# Patient Record
Sex: FEMALE | Race: WHITE
[De-identification: names, ages, dates, MRNs, and addresses within clinical notes are randomized per-mention and may not be internally consistent; named-entity substitution may affect disease eponyms.]

---

## 2019-04-12 ENCOUNTER — HOSPITAL ENCOUNTER (EMERGENCY)
Dept: HOSPITAL 80 - FED | Age: 19
Discharge: HOME | End: 2019-04-12
Payer: COMMERCIAL

## 2019-04-12 VITALS — SYSTOLIC BLOOD PRESSURE: 114 MMHG | DIASTOLIC BLOOD PRESSURE: 79 MMHG

## 2019-04-12 DIAGNOSIS — R11.0: ICD-10-CM

## 2019-04-12 DIAGNOSIS — E86.9: ICD-10-CM

## 2019-04-12 DIAGNOSIS — N93.9: ICD-10-CM

## 2019-04-12 DIAGNOSIS — R10.2: Primary | ICD-10-CM

## 2019-04-12 LAB — PLATELET # BLD: 245 10^3/UL (ref 150–400)

## 2019-04-12 NOTE — EDPHY
H & P


Stated Complaint: PID/Abd pain


Time Seen by Provider: 19 14:07


HPI/ROS: 





CHIEF COMPLAINT:  Nausea, abdominal pain, pelvic pain, vaginal bleeding





HISTORY OF PRESENT ILLNESS:  This is a 18-year-old female who reports that for 

the last 3 weeks she has had intermittent nausea and generalized abdominal 

pain.  She has also had intermittent vaginal bleeding.  About 3 weeks ago she 

was diagnosed with a strep infection on her face.  At that time she felt 

generally poorly with some nausea.  Strep infection has cleared for the most 

part, however she continued to have upper abdominal discomfort as well as lower 

abdominal discomfort.  She went to see a family practice clinic 4 days ago.  At 

that time pelvic exam revealed significant cervical motion tenderness.  Patient 

was treated for PID.  She did receive ceftriaxone IM, doxycycline, and Flagyl 

by mouth.  


She presents today reporting ongoing symptoms of nausea, vomiting, feels 

dehydrated, and pelvic pain.


Patient did have an ultrasound 3 days ago demonstrating normal ovaries, normal 

flow, no cyst, no evidence of tubo-ovarian abscess, and IUD in good position.





Patient denies any fever chills.  She does have some shortness of breath, cold 

symptoms, cough.  Denies vaginal discharge.





 AB1.


Has an IUD in place.  States this is her 3rd episode of "PID".


GC and chlamydia testing at the outside clinic were negative.  LFTs were 

negative.





REVIEW OF SYSTEMS: 


A comprehensive 10 system review of systems was reviewed and is otherwise 

negative aside from elements mentioned in the history of present illness and 

medical decision making.





PAST MEDICAL HISTORY:  PID.





SOCIAL HISTORY:  Reports she is in a long-term relationship with 1 partner.  No 

new sexual partners.  Does not use condoms.





VITAL SIGNS Reviewed by me.  Afebrile.


GENERAL:  Well-developed, well-nourished, resting comfortably in no respiratory 

distress.  Sounds somewhat congested.


HEENT:  Atraumatic.  Patient has a healing rash over the right side of her 

forehead, at the tip of her nose, and along the right scalp line.  I question 

whether her strep infection may have actually been herpes zoster.  Eyes:  No 

icterus, no injection. Mouth:  Slightly dry mucous membranes.  Palatal 

petechiae.  Mild erythema.  No tonsillar enlargement or exudates.  Neck:  

supple with no adenopathy.


LUNGS:  Clear to auscultation bilaterally, no wheezes, rhonchi or rales.


CARDIAC:  Regular rate and rhythm, no rubs, murmurs or gallops.


ABDOMEN:  Soft, general epigastric right and left upper quadrant tenderness to 

palpation.  No guarding or rebound.  Slight suprapubic and right adnexal 

tenderness to palpation.  No guarding or rebound.  No distension.


PELVIC:  Normal female external genitalia.  No blood in the vaginal vault.  

Mild cervical motion tenderness.  Right adnexal tenderness.  Patient reports 

the symptoms are improved in comparison to prior.


BACK:  No CVA tenderness.


EXTREMITIES:  No trauma. No edema.  Range of motion is normal throughout.


NEURO:  Alert and oriented,  grossly nonfocal.


SKIN:  Warm and dry, no rash.


PSYCHIATRIC:  Normal mentation, no agitation.





- Personal History


LMP (Females 10-55): Irregular


Current Tetanus/Diphtheria Vaccine: Yes





- Medical/Surgical History


Hx Asthma: No


Hx Chronic Respiratory Disease: No


Hx Diabetes: No


Hx Cardiac Disease: No


Hx Renal Disease: No


Hx Cirrhosis: No


Hx Alcoholism: No


Hx HIV/AIDS: No


Hx Splenectomy or Spleen Trauma: No


Other PMH: pancreatitis/pid/r hip surgery, ovarian cyst





- Social History


Smoking Status: Never smoked


Constitutional: 


 Initial Vital Signs











Temperature (C)  36.6 C   19 14:00


 


Heart Rate  88   19 14:00


 


Respiratory Rate  18   19 14:00


 


Blood Pressure  140/88 H  19 14:00


 


O2 Sat (%)  98   19 14:00








 











O2 Delivery Mode               Room Air














Allergies/Adverse Reactions: 


 





No Known Allergies Allergy (Unverified 19 14:02)


 








Home Medications: 














 Medication  Instructions  Recorded


 


Doxycycline Hyclate 100 mg PO BID #20 tab 18


 


Hydrocodone/APAP 5/325 [Norco 1 - 2 each PO Q4-6PRN PRN #20 tab 18





5/325]  


 


metroNIDAZOLE [Flagyl 500 mg (*)] 500 mg PO BID #20 tab 18


 


metroNIDAZOLE [Flagyl 500 mg (*)] 500 mg PO BID #20 tab 18


 


Ondansetron Odt [Zofran Odt 4 mg 4 mg PO Q6 PRN #8 tab 19





(RX)]  














Medical Decision Making





- Diagnostics


Imaging Results: 





Impression: Clear lungs. No acute process. 


 


               Dictated By: Nicolas Mchugh MD 


 





Impression: 


1. No pneumoperitoneum or evidence of acute bowel obstruction. 


2. Gaseous bowel pattern suggests gastroenteritis. 


 


               Dictated By: Nicolas Mchugh MD 


 





Imaging: I viewed and interpreted images myself


ED Course/Re-evaluation: 





IV established.


Patient has a normal white count.  Not pregnant.  Chemistries largely 

unremarkable.  LFTs normal.  Do not feel that a right upper quadrant ultrasound 

is indicated.


Chest x-ray for the patient's cough was negative for any infection.


Abdominal x-rays demonstrate nonspecific bowel gas pattern, no constipation, no 

bowel obstruction.





Urinalysis with some bacteria.


Records obtained from the patient's family practice Clinic.  Will not repeat an 

ultrasound.


Studies for bacterial vaginosis had not been obtained previously.  Patient has 

already been on antibiotics for a couple of days, however we will send BV, 

Trichomonas, Candida DNA test.





Patient's course was discussed with Dr. Merline Turner.  I encourage the patient 

to be sure she follows up expeditiously given the fact that she continues to 

have symptoms suggestive of potential pelvic inflammatory disease with an IUD 

in place.





Suspect patient's vague abdominal complaints of nausea and vomiting may be 

related to mild gastroenteritis or effects of antibiotics.


Please see the discharge instructions.


Differential Diagnosis: 





The differential diagnosis for the patient's abdominal pain was considered 

including but not limited to ovarian cyst, pelvic inflammatory disease, ovarian 

torsion, urinary tract infection, pregnancy related complications, 

gastroenteritis.





- Data Points


Laboratory Results: 


 Laboratory Results





 19 14:10 





 19 14:10 








Medications Given: 


 








Discontinued Medications





Sodium Chloride (Ns)  1,000 mls @ 0 mls/hr IV ONCE ONE; Wide Open


   PRN Reason: Protocol


   Stop: 19 14:46


   Last Admin: 19 14:50 Dose:  1,000 mls


Sodium Chloride (Ns)  1,000 mls @ 0 mls/hr IV ONCE ONE; Wide Open


   PRN Reason: Protocol


   Stop: 19 15:39


   Last Admin: 19 15:40 Dose:  1,000 mls


Ondansetron HCl (Zofran)  4 mg IVP EDNOW ONE


   Stop: 19 14:46


   Last Admin: 19 14:50 Dose:  4 mg








Departure





- Departure


Disposition: Home, Routine, Self-Care


Clinical Impression: 


 Pelvic pain





Condition: Good


Instructions:  Dysfunctional Uterine Bleeding (ED), Pelvic Pain in Women (ED)


Additional Instructions: 


1. Please continue to take the antibiotics as directed.  Please note that 

Flagyl may make you somewhat nauseated and give you some abdominal pain.  Take 

it with food.  Avoid alcohol.





2. You been given a prescription for Zofran.  Please use this as needed for any 

ongoing nausea.





3. Please drink plenty of fluids and get plenty of rest.  Drink small, frequent 

sips of fluid throughout the day.





4. Please follow up with OBGYN as directed.  I did discuss her case with Dr. Merline laird.  Please call her office for an appointment.  You should be seen 

early next week.





5. Please return to the emergency department or seek care urgently if you 

develop a fever, worsening pain, significant recurrent vomiting not controlled 

with the nausea meds, or other concerns.





6. No unprotected intercourse until you are further evaluated.  This means use 

a condom every time.  


Referrals: 


Harika Jara PA [Primary Care Provider] - As per Instructions


Merline Turner MD [Medical Doctor] - 2-3 days, call for appt.


Prescriptions: 


Ondansetron Odt [Zofran Odt 4 mg (RX)] 4 mg PO Q6 PRN #8 tab


 PRN Reason: Nausea

## 2019-04-13 NOTE — EDPHY
HPI/HX/ROS/PE/MDM


Narrative: 





CHIEF COMPLAINT:   





HISTORY OF PRESENT ILLNESS:   





No fever, chills, chest pain, shortness of breath, palpitations, vomiting, 

diarrhea, urinary complaints, headache, lightheadedness.  





REVIEW OF SYSTEMS: 


A comprehensive 10 system review of systems was reviewed and is otherwise 

negative aside from elements mentioned in the history of present illness and 

medical decision making.





PAST MEDICAL HISTORY:    





SOCIAL HISTORY:    





VITAL SIGNS Reviewed by me.


GENERAL:  Well-developed, well-nourished, resting comfortably in no respiratory 

distress.


HEENT:  Atraumatic.  Eyes:  No icterus, no injection. Mouth:  moist mucous 

membranes.  No erythema or lesions. Neck:  supple with no adenopathy.


LUNGS:  Clear to auscultation bilaterally, no wheezes, rhonchi or rales.


CARDIAC:  Regular rate and rhythm, no rubs, murmurs or gallops.


ABDOMEN:  Soft, nontender, nondistended, bowel sounds normal.


BACK:  No CVA tenderness.


EXTREMITIES:  No trauma. No edema.  Range of motion is normal throughout.


NEURO:  Alert and oriented,  grossly nonfocal.


SKIN:  Warm and dry, no rash.


PSYCHIATRIC:  Normal mentation, no agitation.











 





- Data Points


Laboratory Results: 





 Laboratory Results





 04/12/19 14:10 





 04/12/19 14:10 








Medications Given: 





 








Discontinued Medications





Sodium Chloride (Ns)  1,000 mls @ 0 mls/hr IV ONCE ONE; Wide Open


   PRN Reason: Protocol


   Stop: 04/12/19 14:46


   Last Admin: 04/12/19 14:50 Dose:  1,000 mls


Sodium Chloride (Ns)  1,000 mls @ 0 mls/hr IV ONCE ONE; Wide Open


   PRN Reason: Protocol


   Stop: 04/12/19 15:39


   Last Admin: 04/12/19 15:40 Dose:  1,000 mls


Ondansetron HCl (Zofran)  4 mg IVP EDNOW ONE


   Stop: 04/12/19 14:46


   Last Admin: 04/12/19 14:50 Dose:  4 mg








General


Time Seen by Provider: 04/12/19 14:07


Initial Vital Signs: 





 Initial Vital Signs











Temperature (C)  36.6 C   04/12/19 14:00


 


Heart Rate  88   04/12/19 14:00


 


Respiratory Rate  18   04/12/19 14:00


 


Blood Pressure  140/88 H  04/12/19 14:00


 


O2 Sat (%)  98   04/12/19 14:00








 











O2 Delivery Mode               Room Air














Allergies/Adverse Reactions: 


 





No Known Allergies Allergy (Unverified 04/12/19 14:02)


 








Home Medications: 














 Medication  Instructions  Recorded


 


Doxycycline Hyclate 100 mg PO BID #20 tab 08/04/18


 


Hydrocodone/APAP 5/325 [Norco 1 - 2 each PO Q4-6PRN PRN #20 tab 08/04/18





5/325]  


 


metroNIDAZOLE [Flagyl 500 mg (*)] 500 mg PO BID #20 tab 08/04/18


 


metroNIDAZOLE [Flagyl 500 mg (*)] 500 mg PO BID #20 tab 08/05/18


 


Ondansetron Odt [Zofran Odt 4 mg 4 mg PO Q6 PRN #8 tab 04/12/19





(RX)]  














Departure





- Departure


Disposition: Home, Routine, Self-Care


Clinical Impression: 


 Pelvic pain





Condition: Good


Instructions:  Dysfunctional Uterine Bleeding (ED), Pelvic Pain in Women (ED)


Additional Instructions: 


1. Please continue to take the antibiotics as directed.  Please note that 

Flagyl may make you somewhat nauseated and give you some abdominal pain.  Take 

it with food.  Avoid alcohol.





2. You been given a prescription for Zofran.  Please use this as needed for any 

ongoing nausea.





3. Please drink plenty of fluids and get plenty of rest.  Drink small, frequent 

sips of fluid throughout the day.





4. Please follow up with OBGYN as directed.  I did discuss her case with Dr. Merline laird.  Please call her office for an appointment.  You should be seen 

early next week.





5. Please return to the emergency department or seek care urgently if you 

develop a fever, worsening pain, significant recurrent vomiting not controlled 

with the nausea meds, or other concerns.





6. No unprotected intercourse until you are further evaluated.  This means use 

a condom every time.  


Referrals: 


Merline Turner MD [Medical Doctor] - 2-3 days, call for appt.


Harika Jara PA [Primary Care Provider] - As per Instructions


Prescriptions: 


Ondansetron Odt [Zofran Odt 4 mg (RX)] 4 mg PO Q6 PRN #8 tab


 PRN Reason: Nausea